# Patient Record
Sex: MALE
[De-identification: names, ages, dates, MRNs, and addresses within clinical notes are randomized per-mention and may not be internally consistent; named-entity substitution may affect disease eponyms.]

---

## 2021-10-04 ENCOUNTER — HOSPITAL ENCOUNTER (OUTPATIENT)
Dept: HOSPITAL 35 - PAIN | Age: 76
End: 2021-10-04
Attending: ANESTHESIOLOGY
Payer: COMMERCIAL

## 2021-10-04 VITALS — SYSTOLIC BLOOD PRESSURE: 158 MMHG | DIASTOLIC BLOOD PRESSURE: 97 MMHG

## 2021-10-04 VITALS — WEIGHT: 118.6 LBS | BODY MASS INDEX: 19.06 KG/M2 | HEIGHT: 65.98 IN

## 2021-10-04 DIAGNOSIS — M25.552: ICD-10-CM

## 2021-10-04 DIAGNOSIS — M54.50: Primary | ICD-10-CM

## 2021-10-04 DIAGNOSIS — M79.605: ICD-10-CM

## 2021-10-04 NOTE — NUR
Pain Clinic Assessment:
 
1. History of Osteoarthritis:
Not Applicable
   History of Rheumatoid Arthritis:
Not Applicable
 
2. Height: 5 ft. 6 in. 167.6 cm.
   Weight: 118.6 lb.  oz. 53.796 kg.
   Patient's BMI: 19.2
 
3. Vital Signs:
   BP: 158/97 Pulse: 81 Resp: 16
   Temp:  02 Sat: 99 ECG Mon:
 
4. Pain Intensity: 6
 
5. Fall Risk:
   Dizziness: N  Needs help standing or walking: N
   Fallen in the last 3 months: N
   Fall risk comments:
 
 
6. Patient on Blood Thinner: None
 
7. History of Hypertension: Y
 
8. Opioid Therapy greater than 6 weeks:
   Opiate Contract Signed:
 
9. Risk Assessment Tool Provided: 0 LOW RISK
 
10. Functional Assessment Tool: 46/70
 
11. Recreational Drug Use: Never Drug Type:
    Tobacco Use: Former Smoker Tobacco Type:
       Amount or Packs/day:  How Many Years:
    Alcohol Use: No  Frequency:  Quant:

## 2021-10-11 ENCOUNTER — HOSPITAL ENCOUNTER (OUTPATIENT)
Dept: HOSPITAL 35 - MRI | Age: 76
End: 2021-10-11
Attending: ANESTHESIOLOGY
Payer: COMMERCIAL

## 2021-10-11 DIAGNOSIS — M47.26: Primary | ICD-10-CM

## 2021-10-11 DIAGNOSIS — M48.061: ICD-10-CM

## 2021-10-11 DIAGNOSIS — M43.8X6: ICD-10-CM

## 2021-10-14 ENCOUNTER — HOSPITAL ENCOUNTER (OUTPATIENT)
Dept: HOSPITAL 35 - PAIN | Age: 76
End: 2021-10-14
Attending: ANESTHESIOLOGY
Payer: COMMERCIAL

## 2021-10-14 VITALS — BODY MASS INDEX: 19.13 KG/M2 | WEIGHT: 119 LBS | HEIGHT: 65.98 IN

## 2021-10-14 VITALS — SYSTOLIC BLOOD PRESSURE: 150 MMHG | DIASTOLIC BLOOD PRESSURE: 86 MMHG

## 2021-10-14 DIAGNOSIS — M48.07: Primary | ICD-10-CM

## 2021-10-14 DIAGNOSIS — M48.062: ICD-10-CM

## 2021-10-14 NOTE — NUR
Pain Clinic Assessment:
 
1. History of Osteoarthritis:
Not Applicable
   History of Rheumatoid Arthritis:
Not Applicable
 
2. Height: 5 ft. 6 in. 167.6 cm.
   Weight: 119.0 lb.  oz. 53.978 kg.
   Patient's BMI: 19.2
 
3. Vital Signs:
   BP: 150/86 Pulse: 76 Resp: 14
   Temp:  02 Sat: 97 ECG Mon:
 
4. Pain Intensity: 7
 
5. Fall Risk:
   Dizziness: N  Needs help standing or walking: N
   Fallen in the last 3 months: N
   Fall risk comments:
 
 
6. Patient on Blood Thinner: None
 
7. History of Hypertension: Y
 
8. Opioid Therapy greater than 6 weeks: N
   Opiate Contract Signed:
 
9. Risk Assessment Tool Provided: 0 LOW RISK
 
10. Functional Assessment Tool: 46/70
 
11. Recreational Drug Use: Never Drug Type:
    Tobacco Use: Former Smoker Tobacco Type:
       Amount or Packs/day:  How Many Years:
    Alcohol Use: No  Frequency:  Quant:

## 2021-11-09 ENCOUNTER — HOSPITAL ENCOUNTER (OUTPATIENT)
Dept: HOSPITAL 35 - PAIN | Age: 76
End: 2021-11-09
Attending: CLINICAL NURSE SPECIALIST
Payer: COMMERCIAL

## 2021-11-09 VITALS — WEIGHT: 120.2 LBS | HEIGHT: 65.98 IN | BODY MASS INDEX: 19.32 KG/M2

## 2021-11-09 VITALS — SYSTOLIC BLOOD PRESSURE: 159 MMHG | DIASTOLIC BLOOD PRESSURE: 94 MMHG

## 2021-11-09 DIAGNOSIS — M47.26: ICD-10-CM

## 2021-11-09 DIAGNOSIS — M48.062: Primary | ICD-10-CM

## 2021-11-09 DIAGNOSIS — Z79.899: ICD-10-CM

## 2021-11-09 DIAGNOSIS — Z87.891: ICD-10-CM

## 2021-11-09 DIAGNOSIS — Z79.4: ICD-10-CM

## 2021-11-09 NOTE — NUR
Pain Clinic Assessment:
 
1. History of Osteoarthritis:
Not Applicable
   History of Rheumatoid Arthritis:
Not Applicable
 
2. Height: 5 ft. 6 in. 167.6 cm.
   Weight: 120.2 lb.  oz. 54.522 kg.
   Patient's BMI: 19.4
 
3. Vital Signs:
   BP: 159/94 Pulse: 93 Resp: 14
   Temp:  02 Sat: 100 ECG Mon:
 
4. Pain Intensity: 6
 
5. Fall Risk:
   Dizziness: N  Needs help standing or walking: N
   Fallen in the last 3 months: N
   Fall risk comments:
 
 
6. Patient on Blood Thinner: None
 
7. History of Hypertension: Y
 
8. Opioid Therapy greater than 6 weeks: N
   Opiate Contract Signed:
 
9. Risk Assessment Tool Provided: 0 LOW RISK
 
10. Functional Assessment Tool: 46/70
 
11. Recreational Drug Use: Never Drug Type:
    Tobacco Use: Former Smoker Tobacco Type:
       Amount or Packs/day:  How Many Years:
    Alcohol Use: No  Frequency:  Quant:

## 2021-11-15 ENCOUNTER — HOSPITAL ENCOUNTER (OUTPATIENT)
Dept: HOSPITAL 35 - PAIN | Age: 76
Discharge: HOME | End: 2021-11-15
Attending: ANESTHESIOLOGY
Payer: COMMERCIAL

## 2021-11-15 VITALS — BODY MASS INDEX: 19.16 KG/M2 | WEIGHT: 119.2 LBS | HEIGHT: 65.98 IN

## 2021-11-15 VITALS — DIASTOLIC BLOOD PRESSURE: 96 MMHG | SYSTOLIC BLOOD PRESSURE: 159 MMHG

## 2021-11-15 DIAGNOSIS — G89.29: ICD-10-CM

## 2021-11-15 DIAGNOSIS — M54.16: ICD-10-CM

## 2021-11-15 DIAGNOSIS — Z87.891: ICD-10-CM

## 2021-11-15 DIAGNOSIS — M48.062: Primary | ICD-10-CM

## 2021-11-15 DIAGNOSIS — Z98.890: ICD-10-CM

## 2021-11-15 DIAGNOSIS — Z79.899: ICD-10-CM

## 2021-11-15 NOTE — NUR
Pain Clinic Assessment:
 
1. History of Osteoarthritis:
Not Applicable
   History of Rheumatoid Arthritis:
Not Applicable
 
2. Height: 5 ft. 6 in. 167.6 cm.
   Weight: 119.2 lb.  oz. 54.069 kg.
   Patient's BMI: 19.2
 
3. Vital Signs:
   BP: 159/96 Pulse: 88 Resp: 14
   Temp:  02 Sat: 99 ECG Mon:
 
4. Pain Intensity: 3
 
5. Fall Risk:
   Dizziness: N  Needs help standing or walking: N
   Fallen in the last 3 months: N
   Fall risk comments:
 
 
6. Patient on Blood Thinner: None
 
7. History of Hypertension: Y
 
8. Opioid Therapy greater than 6 weeks: N
   Opiate Contract Signed:
 
9. Risk Assessment Tool Provided: 0 LOW RISK
 
10. Functional Assessment Tool: 46/70
 
11. Recreational Drug Use: Never Drug Type:
    Tobacco Use: Former Smoker Tobacco Type:
       Amount or Packs/day:  How Many Years:
    Alcohol Use: No  Frequency:  Quant:

## 2022-01-25 ENCOUNTER — HOSPITAL ENCOUNTER (OUTPATIENT)
Dept: HOSPITAL 35 - PAIN | Age: 77
End: 2022-01-25
Attending: CLINICAL NURSE SPECIALIST
Payer: COMMERCIAL

## 2022-01-25 VITALS — HEIGHT: 65.98 IN | WEIGHT: 112.4 LBS | BODY MASS INDEX: 18.06 KG/M2

## 2022-01-25 VITALS — DIASTOLIC BLOOD PRESSURE: 83 MMHG | SYSTOLIC BLOOD PRESSURE: 133 MMHG

## 2022-01-25 DIAGNOSIS — M53.3: ICD-10-CM

## 2022-01-25 DIAGNOSIS — Z79.84: ICD-10-CM

## 2022-01-25 DIAGNOSIS — M48.07: ICD-10-CM

## 2022-01-25 DIAGNOSIS — Z79.899: ICD-10-CM

## 2022-01-25 DIAGNOSIS — Z87.891: ICD-10-CM

## 2022-01-25 DIAGNOSIS — M48.062: ICD-10-CM

## 2022-01-25 DIAGNOSIS — M54.16: Primary | ICD-10-CM

## 2022-01-25 NOTE — NUR
Pain Clinic Assessment:
 
1. History of Osteoarthritis:
Not Applicable
   History of Rheumatoid Arthritis:
Not Applicable
 
2. Height: 5 ft. 6 in. 167.6 cm.
   Weight: 112.4 lb.  oz. 50.984 kg.
   Patient's BMI: 18.2
 
3. Vital Signs:
   BP: 133/83 Pulse: 100 Resp: 14
   Temp:  02 Sat: 98 ECG Mon:
 
4. Pain Intensity: 5
 
5. Fall Risk:
   Dizziness: N  Needs help standing or walking: N
   Fallen in the last 3 months: N
   Fall risk comments:
 
 
6. Patient on Blood Thinner: None
 
7. History of Hypertension: Y
 
8. Opioid Therapy greater than 6 weeks: N
   Opiate Contract Signed:
 
9. Risk Assessment Tool Provided: 0 LOW RISK
 
10. Functional Assessment Tool: 46/70
 
11. Recreational Drug Use: Never Drug Type:
    Tobacco Use: Former Smoker Tobacco Type:
       Amount or Packs/day:  How Many Years:
    Alcohol Use: No  Frequency:  Quant: